# Patient Record
Sex: FEMALE | Race: WHITE | Employment: OTHER | URBAN - METROPOLITAN AREA
[De-identification: names, ages, dates, MRNs, and addresses within clinical notes are randomized per-mention and may not be internally consistent; named-entity substitution may affect disease eponyms.]

---

## 2022-04-11 ENCOUNTER — HOSPITAL ENCOUNTER (OUTPATIENT)
Dept: ORTHOPEDIC SURGERY | Age: 82
Discharge: HOME | End: 2022-04-13
Payer: MEDICARE

## 2022-04-11 ENCOUNTER — OFFICE VISIT (OUTPATIENT)
Dept: ORTHOPEDIC SURGERY | Age: 82
End: 2022-04-11
Payer: COMMERCIAL

## 2022-04-11 VITALS
OXYGEN SATURATION: 95 % | RESPIRATION RATE: 16 BRPM | WEIGHT: 141.4 LBS | BODY MASS INDEX: 26.7 KG/M2 | TEMPERATURE: 98 F | HEART RATE: 93 BPM | HEIGHT: 61 IN

## 2022-04-11 DIAGNOSIS — Z96.651 HX OF TOTAL KNEE REPLACEMENT, RIGHT: ICD-10-CM

## 2022-04-11 DIAGNOSIS — Z96.651 HX OF TOTAL KNEE REPLACEMENT, RIGHT: Primary | ICD-10-CM

## 2022-04-11 PROCEDURE — 73562 X-RAY EXAM OF KNEE 3: CPT | Performed by: PHYSICIAN ASSISTANT

## 2022-04-11 PROCEDURE — 73562 X-RAY EXAM OF KNEE 3: CPT

## 2022-04-11 PROCEDURE — 99214 OFFICE O/P EST MOD 30 MIN: CPT | Performed by: PHYSICIAN ASSISTANT

## 2022-04-11 RX ORDER — LOPERAMIDE HYDROCHLORIDE 2 MG/1
TABLET ORAL
COMMUNITY
Start: 2004-09-27

## 2022-04-11 RX ORDER — ZOLPIDEM TARTRATE 10 MG/1
5 TABLET ORAL
COMMUNITY
Start: 2019-12-26 | End: 2022-09-06

## 2022-04-11 RX ORDER — ASPIRIN 81 MG/1
81 TABLET ORAL DAILY
COMMUNITY
Start: 2020-09-23

## 2022-04-11 RX ORDER — OMEPRAZOLE 20 MG/1
20 CAPSULE, DELAYED RELEASE ORAL
COMMUNITY
Start: 2019-12-26

## 2022-04-11 RX ORDER — SIMVASTATIN 10 MG
10 TABLET ORAL NIGHTLY
COMMUNITY
Start: 2020-05-28

## 2022-04-11 RX ORDER — FLUTICASONE PROPIONATE 50 MCG
SPRAY, SUSPENSION (ML) NASAL
COMMUNITY
Start: 2019-12-26

## 2022-04-11 RX ORDER — AMOXICILLIN 500 MG/1
500 TABLET, FILM COATED ORAL
Status: ON HOLD | COMMUNITY
Start: 2018-03-07 | End: 2022-09-19 | Stop reason: ALTCHOICE

## 2022-04-11 RX ORDER — LOSARTAN POTASSIUM 25 MG/1
25 TABLET ORAL DAILY
COMMUNITY
Start: 2019-12-26

## 2022-04-11 RX ORDER — LORATADINE 10 MG/1
10 TABLET ORAL NIGHTLY
COMMUNITY
Start: 2019-12-26

## 2022-04-11 RX ORDER — PAROXETINE HYDROCHLORIDE 20 MG/1
TABLET, FILM COATED ORAL
COMMUNITY
Start: 2022-02-19

## 2022-04-11 RX ORDER — TRIAMTERENE AND HYDROCHLOROTHIAZIDE 37.5; 25 MG/1; MG/1
TABLET ORAL
COMMUNITY
Start: 2022-02-21

## 2022-04-11 RX ORDER — HYDROXYZINE HYDROCHLORIDE 10 MG/1
TABLET, FILM COATED ORAL
COMMUNITY
Start: 2004-09-27

## 2022-04-11 RX ORDER — ALBUTEROL SULFATE 90 UG/1
1 AEROSOL, METERED RESPIRATORY (INHALATION) EVERY 6 HOURS PRN
COMMUNITY
Start: 2013-07-07

## 2022-04-11 ASSESSMENT — ENCOUNTER SYMPTOMS
EYES NEGATIVE: 1
GASTROINTESTINAL NEGATIVE: 1
RESPIRATORY NEGATIVE: 1

## 2022-04-11 NOTE — PROGRESS NOTES
Merrill Clark (:  1940) is a [de-identified] y.o. female,Established patient, here for evaluation of the following chief complaint(s):  Knee Pain (hx of right knee replacement 2 yr ago, having pain. Former [facility] pt. Pt rates pain today /10. Veries, states she has a pinching sensation.)         ASSESSMENT/PLAN:  1. Hx of total knee replacement, right  -     XR KNEE RIGHT (3 VIEWS); Future  -     NM BONE SCAN 3 PHASE; Future      No follow-ups on file. Subjective   SUBJECTIVE/OBJECTIVE:  This is a 43-year-old female patient that I have seen previously for both of her knees. She states she had a right total knee replacement performed at the [facility] clinic by Dr. Mart Hamper May 28, 2020. She states she has had continued pain at the anterior lateral aspect of the knee. She states the knee was drained of a large amount of bloody fluid after her surgery. She states she continues to complain of a catching in the knee that is painful. Is performed extended visits to physical therapy. Review of Systems   Constitutional: Negative. HENT: Negative. Eyes: Negative. Respiratory: Negative. Gastrointestinal: Negative. Genitourinary: Negative. Musculoskeletal: Negative. Psychiatric/Behavioral: Negative. Objective      Three-view x-rays of the right knee show intact right total knee arthroplasty with patellar resurfacing in good alignment. Physical Exam  Musculoskeletal:      Comments: Right knee-no joint effusion. No warmth, erythema, fluctuance or sign of infection. Full extension, flexion is 120 degrees. The knee joint is stable, there is no laxity with valgus or varus stress. There is tenderness with palpation of the lateral tibial plateau. Femoral condyles are nontender. Shoulder compression test negative. Calf is soft and nontender. I explained to the patient and her knee plain film x-rays do not show any evidence of loosening.   I suggested we proceed with a bone scan for fear of prosthetic loosening. She is to follow-up with me after the bone scan. On this date 4/11/2022 I have spent 25 minutes reviewing previous notes, test results and face to face with the patient discussing the diagnosis and importance of compliance with the treatment plan as well as documenting on the day of the visit. An electronic signature was used to authenticate this note.     --SEBAS Guy

## 2022-04-15 ENCOUNTER — HOSPITAL ENCOUNTER (OUTPATIENT)
Dept: NUCLEAR MEDICINE | Age: 82
Discharge: HOME | End: 2022-04-17
Payer: MEDICARE

## 2022-04-15 DIAGNOSIS — Z96.651 HX OF TOTAL KNEE REPLACEMENT, RIGHT: ICD-10-CM

## 2022-04-15 PROCEDURE — 3430000000 HC RX DIAGNOSTIC RADIOPHARMACEUTICAL: Performed by: PHYSICIAN ASSISTANT

## 2022-04-15 PROCEDURE — A9503 TC99M MEDRONATE: HCPCS | Performed by: PHYSICIAN ASSISTANT

## 2022-04-15 PROCEDURE — 78315 BONE IMAGING 3 PHASE: CPT

## 2022-04-15 RX ORDER — TC 99M MEDRONATE 20 MG/10ML
25 INJECTION, POWDER, LYOPHILIZED, FOR SOLUTION INTRAVENOUS
Status: COMPLETED | OUTPATIENT
Start: 2022-04-15 | End: 2022-04-15

## 2022-04-15 RX ADMIN — TC 99M MEDRONATE 28.2 MILLICURIE: 20 INJECTION, POWDER, LYOPHILIZED, FOR SOLUTION INTRAVENOUS at 11:14

## 2022-05-02 ENCOUNTER — OFFICE VISIT (OUTPATIENT)
Dept: ORTHOPEDIC SURGERY | Age: 82
End: 2022-05-02
Payer: MEDICARE

## 2022-05-02 DIAGNOSIS — M25.561 CHRONIC KNEE PAIN AFTER TOTAL REPLACEMENT OF RIGHT KNEE JOINT: ICD-10-CM

## 2022-05-02 DIAGNOSIS — Z96.651 CHRONIC KNEE PAIN AFTER TOTAL REPLACEMENT OF RIGHT KNEE JOINT: ICD-10-CM

## 2022-05-02 DIAGNOSIS — Z96.651 HX OF TOTAL KNEE REPLACEMENT, RIGHT: Primary | ICD-10-CM

## 2022-05-02 DIAGNOSIS — G89.29 CHRONIC KNEE PAIN AFTER TOTAL REPLACEMENT OF RIGHT KNEE JOINT: ICD-10-CM

## 2022-05-02 PROCEDURE — 99213 OFFICE O/P EST LOW 20 MIN: CPT | Performed by: PHYSICIAN ASSISTANT

## 2022-05-02 ASSESSMENT — ENCOUNTER SYMPTOMS
GASTROINTESTINAL NEGATIVE: 1
EYES NEGATIVE: 1
RESPIRATORY NEGATIVE: 1

## 2022-05-02 NOTE — PROGRESS NOTES
Merrill Clark (:  1940) is a [de-identified] y.o. female,Established patient, here for evaluation of the following chief complaint(s):  Follow-up (bone density test results. Test was done 04/15/2022)         ASSESSMENT/PLAN:  1. Hx of total knee replacement, right  -     Ambulatory referral to Orthopedic Surgery  2. Chronic knee pain after total replacement of right knee joint  -     Ambulatory referral to Orthopedic Surgery      No follow-ups on file. Subjective   SUBJECTIVE/OBJECTIVE:  This is an 23-year-old female with complaint of right knee pain after right total knee replacement. She had the replacement performed by Dr. Samuel Garvey May 28, 2020. She states she has had pain since the surgery. States the pain is at the anterior lateral aspect of the knee joint. 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 She states the pain comes and goes. She states bending the knee increases the pain. She has had a bone scan and is here for results. Review of Systems   Constitutional: Negative. HENT: Negative. Eyes: Negative. Respiratory: Negative. Gastrointestinal: Negative. Genitourinary: Negative. Musculoskeletal: Negative. Psychiatric/Behavioral: Negative.            Objective    Three-phase bone scan performed April 15, 2022 shows:  NM BONE SCAN 3 PHASE : 4/15/2022       CLINICAL HISTORY: Z96.651 Hx of total knee replacement, right ICD10.       COMPARISON: Outside knee radiographs 2022       TECHNIQUE: After the intravenous administration of approximately 28.2 mCi of technetium 99m MDP, a three-phase bone scan of both knees was obtained.       Delayed imaging was obtained of the whole body.           FINDINGS:        There is no significant increased blood flow or abnormal blood pool activity identified to the knees.       Expected delayed radiotracer accumulation is noted around the total right knee arthroplasty, and the lateral left knee hemiarthroplasty.     Degenerative appearing uptake is noted, predominantly of the lumbar spine, ankles, shoulders, and left knee.                   Impression       ESSENTIALLY NEGATIVE THREE-PHASE BONE SCAN OF THE RIGHT KNEE, AS NOTED.             Physical Exam  Constitutional:       Appearance: Normal appearance. HENT:      Head: Normocephalic and atraumatic. Mouth/Throat:      Mouth: Mucous membranes are moist.   Eyes:      Extraocular Movements: Extraocular movements intact. Musculoskeletal:      Cervical back: Normal range of motion. Comments: Right knee-no swelling or ecchymosis. No warmth, erythema, fluctuance or sign of infection. Full extension, flexion to 125 degrees. The knee joint is stable, there is no laxity with valgus or varus stress. Tenderness with palpation at the anterior lateral joint line. There is soft tissue prominence there that is painful when touched. Calf is soft and nontender. Skin:     General: Skin is warm and dry. Neurological:      General: No focal deficit present. Mental Status: She is oriented to person, place, and time. Psychiatric:         Mood and Affect: Mood normal.     Explained the bone scan to the patient is does not show any sign of loosening. I recommended she meet with Dr. Ilda Adan to discuss possibilities for her continued pain. She does have a soft tissue prominence in that area. I am unsure what this could be. She has tried topical anti-inflammatory medications with only limited improvement. On this date 5/2/2022 I have spent 25 minutes reviewing previous notes, test results and face to face with the patient discussing the diagnosis and importance of compliance with the treatment plan as well as documenting on the day of the visit. An electronic signature was used to authenticate this note.     --SEBAS Sy

## 2022-05-13 ENCOUNTER — OFFICE VISIT (OUTPATIENT)
Dept: ORTHOPEDIC SURGERY | Age: 82
End: 2022-05-13
Payer: MEDICARE

## 2022-05-13 VITALS
BODY MASS INDEX: 26.62 KG/M2 | OXYGEN SATURATION: 97 % | HEART RATE: 70 BPM | HEIGHT: 61 IN | TEMPERATURE: 97 F | WEIGHT: 141 LBS

## 2022-05-13 DIAGNOSIS — Z96.651 PAIN DUE TO TOTAL RIGHT KNEE REPLACEMENT, INITIAL ENCOUNTER (HCC): Primary | ICD-10-CM

## 2022-05-13 DIAGNOSIS — T84.84XA PAIN DUE TO TOTAL RIGHT KNEE REPLACEMENT, INITIAL ENCOUNTER (HCC): Primary | ICD-10-CM

## 2022-05-13 PROCEDURE — 20610 DRAIN/INJ JOINT/BURSA W/O US: CPT | Performed by: ORTHOPAEDIC SURGERY

## 2022-05-13 PROCEDURE — G8427 DOCREV CUR MEDS BY ELIG CLIN: HCPCS | Performed by: ORTHOPAEDIC SURGERY

## 2022-05-13 PROCEDURE — 99204 OFFICE O/P NEW MOD 45 MIN: CPT | Performed by: ORTHOPAEDIC SURGERY

## 2022-05-13 PROCEDURE — G8417 CALC BMI ABV UP PARAM F/U: HCPCS | Performed by: ORTHOPAEDIC SURGERY

## 2022-05-13 PROCEDURE — 1090F PRES/ABSN URINE INCON ASSESS: CPT | Performed by: ORTHOPAEDIC SURGERY

## 2022-05-13 RX ORDER — LIDOCAINE HYDROCHLORIDE 10 MG/ML
8 INJECTION, SOLUTION INFILTRATION; PERINEURAL ONCE
Status: COMPLETED | OUTPATIENT
Start: 2022-05-13 | End: 2022-05-13

## 2022-05-13 RX ORDER — TRIAMCINOLONE ACETONIDE 40 MG/ML
80 INJECTION, SUSPENSION INTRA-ARTICULAR; INTRAMUSCULAR ONCE
Status: COMPLETED | OUTPATIENT
Start: 2022-05-13 | End: 2022-05-13

## 2022-05-13 RX ORDER — PREDNISONE 10 MG/1
TABLET ORAL
Status: ON HOLD | COMMUNITY
Start: 2022-05-12 | End: 2022-09-19 | Stop reason: HOSPADM

## 2022-05-13 RX ADMIN — TRIAMCINOLONE ACETONIDE 80 MG: 40 INJECTION, SUSPENSION INTRA-ARTICULAR; INTRAMUSCULAR at 13:13

## 2022-05-13 RX ADMIN — LIDOCAINE HYDROCHLORIDE 8 ML: 10 INJECTION, SOLUTION INFILTRATION; PERINEURAL at 13:11

## 2022-05-13 ASSESSMENT — ENCOUNTER SYMPTOMS
EYE ITCHING: 0
DIARRHEA: 0
EYE DISCHARGE: 0
EYE PAIN: 0
CONSTIPATION: 0
ABDOMINAL PAIN: 0
SHORTNESS OF BREATH: 0
COUGH: 0

## 2022-05-13 NOTE — PROGRESS NOTES
Patient ID:  Kasia Black is a [de-identified] y.o. female who presents today for evaluation of right knee pain. The patient reports that 2 years ago she had a right total knee replacement. She has been having sharp catching pains on the lateral aspect of the right knee ever since.     Injury: no  Metal Allergy: no    Location: right  knee pain, located on the lateral aspect of the knee  Pain: yes; 8 on a scale of 1 to 10  Onset: gradual  Duration: 2 years  Frequency:  occurs intermittently and occurs daily  Quality: sharp and shooting   Swelling: patient notes intermittent swelling of the joint  Aggravating factors: weight bearing activity, standing and walking  Alleviating factors: removing weight from leg and rest  Mechanical symptoms: catching  Radiation: no    Activities: walking independently  Restriction:  decreased ambulatory tolerance  Progression:  worsening    Previous treatment:  knee replacement  NSAIDs:  intolerant of NSAIDs  PT:  Physical therapy, completed    Medications:    Current Outpatient Medications on File Prior to Visit   Medication Sig Dispense Refill    predniSONE (DELTASONE) 10 MG tablet       aspirin 81 MG EC tablet Take 81 mg by mouth daily      Amoxicillin 500 MG TABS Take 500 mg by mouth      albuterol sulfate HFA (PROAIR HFA) 108 (90 Base) MCG/ACT inhaler Inhale 1 puff into the lungs every 6 hours as needed      calcium carbonate 1500 (600 Ca) MG TABS tablet Take 1,200 mg by mouth      vitamin D (CHOLECALCIFEROL) 25 MCG (1000 UT) TABS tablet Take by mouth      fluticasone (FLONASE ALLERGY RELIEF) 50 MCG/ACT nasal spray   See Instructions, Allergy symptoms, Refill(s) 0, 1 spray(s) Nasal Daily      hydrOXYzine (ATARAX) 10 MG tablet prn      loperamide (IMODIUM A-D) 2 MG tablet prn      loratadine (CLARITIN) 10 MG tablet Take 10 mg by mouth nightly      losartan (COZAAR) 25 MG tablet Take 25 mg by mouth daily      omeprazole (PRILOSEC) 20 MG delayed release capsule Take 20 mg by mouth  PARoxetine (PAXIL) 20 MG tablet       simvastatin (ZOCOR) 10 MG tablet Take 10 mg by mouth nightly      zolpidem (AMBIEN) 10 MG tablet Take 5 mg by mouth.  triamterene-hydroCHLOROthiazide (MAXZIDE-25) 37.5-25 MG per tablet        No current facility-administered medications on file prior to visit. Allergies: Allergies   Allergen Reactions    Propoxyphene Other (See Comments)     vomiting  tolerates oxycodone  Patient states she is not allergic. Past Medical History:    Past Medical History:   Diagnosis Date    Acute eczema     Anxiety     Hypertension        Past Surgical History:    Past Surgical History:   Procedure Laterality Date    JOINT REPLACEMENT Left     Partial    TOTAL KNEE ARTHROPLASTY Right 2020       Social History:    Social History     Socioeconomic History    Marital status:      Spouse name: Not on file    Number of children: Not on file    Years of education: Not on file    Highest education level: Not on file   Occupational History    Not on file   Tobacco Use    Smoking status: Former Smoker     Packs/day: 0.50     Years: 30.00     Pack years: 15.00     Quit date: 80     Years since quittin.3    Smokeless tobacco: Never Used   Substance and Sexual Activity    Alcohol use: Not on file    Drug use: Not on file    Sexual activity: Not on file   Other Topics Concern    Not on file   Social History Narrative    Not on file     Social Determinants of Health     Financial Resource Strain:     Difficulty of Paying Living Expenses: Not on file   Food Insecurity:     Worried About [address] in the Last Year: Not on file    [address] in the Last Year: Not on file   Transportation Needs:     Lack of Transportation (Medical): Not on file    Lack of Transportation (Non-Medical):  Not on file   Physical Activity:     Days of Exercise per Week: Not on file    Minutes of Exercise per Session: Not on file   Stress:     Feeling of Stress : Not on file   Social Connections:     Frequency of Communication with Friends and Family: Not on file    Frequency of Social Gatherings with Friends and Family: Not on file    Attends Voodoo Services: Not on file    Active Member of Clubs or Organizations: Not on file    Attends Club or Organization Meetings: Not on file    Marital Status: Not on file   Intimate Partner Violence:     Fear of Current or Ex-Partner: Not on file    Emotionally Abused: Not on file    Physically Abused: Not on file    Sexually Abused: Not on file   Housing Stability:     Unable to Pay for Housing in the Last Year: Not on file    Number of [place] in the Last Year: Not on file    Unstable Housing in the Last Year: Not on file       Family History:    History reviewed. No pertinent family history. Occupation:  retired   - Occupational requirements:  none    Workers Compensation:  Have you missed work for this issue?  no  Is this issue being addressed under a worker's comp claim? no    Review of Systems:    Review of Systems   Constitutional: Negative for activity change, appetite change and chills. HENT: Negative for congestion, ear pain and hearing loss. Eyes: Negative for pain, discharge and itching. Respiratory: Negative for cough and shortness of breath. Cardiovascular: Negative for chest pain and leg swelling. Gastrointestinal: Negative for abdominal pain, constipation and diarrhea. Endocrine: Negative for cold intolerance, heat intolerance and polydipsia. Genitourinary: Negative for difficulty urinating, flank pain and frequency. Skin: Negative for rash and wound. Allergic/Immunologic: Negative for environmental allergies and food allergies. Neurological: Negative for dizziness, seizures and syncope.        Physical Exam:    Examination of the right knee    Gait:  antalgic gait affecting right  Inspection:  neutral  Swelling:  none  Erythema:  none  Ecchymosis: none  Effusion:  1+  Palpation:  lateral joint line  Extension:  0  Flexion:  120  Strength:  5  Varus/Valgus Instability:  none  Anterior/Posterior Instability:  none  Patellar compression:  negative  Neurological/Vascular:  Sensation grossly intact. Dorsalis pedis palpable and 2+    Radiographs:  Radiographs of the right knee were personally reviewed, and they revealed: Three-view x-rays of the right knee including AP weightbearing, lateral as    well as skyline views show an intact right total knee arthroplasty with    patellar resurfacing.  Left knee shows a lateral unicompartmental knee    replacement. Bone scan:    FINDINGS:        There is no significant increased blood flow or abnormal blood pool activity identified to the knees.       Expected delayed radiotracer accumulation is noted around the total right knee arthroplasty, and the lateral left knee hemiarthroplasty.       Degenerative appearing uptake is noted, predominantly of the lumbar spine, ankles, shoulders, and left knee.                   Impression       ESSENTIALLY NEGATIVE THREE-PHASE BONE SCAN OF THE RIGHT KNEE, AS NOTED. Aspiration of knee:   Ref Range & Units 10/23/20 1240   Site, SF  Right Knee    Color, SF Yellow Bloody Abnormal     Clarity, SF Clear Cloudy Abnormal     Supernatant Color, SF Yellow Alexia Abnormal     Supernatant Clarity, SF Clear Clear    RBC, SF <2,000 /uL [id number] High     Total Nucleated Cells, SF 0 - 200 /uL 731 High     Synovial Comment  SEE COMMENT    Comment: PRELIMINARY REPORT  No diagnostic crystals seen.  SEE FINAL SYN FLD PATH   REVIEW          Diagnosis:   Diagnosis Orders   1. Pain due to total right knee replacement, initial encounter ([facility])  MN ARTHROCENTESIS ASPIR&/INJ MAJOR JT/BURSA W/O US       Plan:    The patient has pain localized to the lateral joint line. My suspicion is that she could possibly have some synovitis which could be getting caught between the implants. We discussed this. In order to confirm this, we discussed an intra-articular steroid injection. I explained to her that this would shrink up the synovitis. If this gives her pain relief, this would confirm what we were suspecting. If the pain were then to return, we could possibly discuss an arthroscopic debridement of the synovium in the anterior lateral knee. The patient wished to proceed with this. We did discuss the possibility of infection with the injection. Patient expressed understanding and consented. Follow-up as needed. Time Out  [x] Patient Verified  [x] Site Verified  [x] Laterality Verified  [x] Procedure Verified    Before aspiration/injection risks/benefits of a cortisone injection including infection, local skin irritation, skin atrophy, continued pain/discomfort, elevated blood sugar, burning, failure to relieve pain, possible late infection were discussed with patient. Patient verbalized understanding and wanted to proceed with planned procedure. After prepping and draping the right knee in the usual sterile fashion, 2 cc of 40 mg/ml kenalog with 8 cc of 1% lidocaine were injected intra-articularly without any complications. Patient tolerated this well. Post-procedure discomfort can be alleviated with additional medications/ice/elevation/rest over the first 24 hours as recommended. The patient tolerated the procedure well. If fluid was aspirated it was sent for further studies  in sterile specimen container as ordered to the lab    Orders Placed This Encounter   Procedures    CO ARTHROCENTESIS ASPIR&/INJ MAJOR JT/BURSA W/O US       Orders Placed This Encounter   Medications    lidocaine 1 % injection 8 mL    triamcinolone acetonide (KENALOG-40) injection 80 mg       Return if symptoms worsen or fail to improve.     Olivia Johnson MD

## 2022-05-13 NOTE — PROGRESS NOTES
Patient's name, date of birth, and allergies have been confirmed. Patient is aware that injection is to be given in Right knee. He/she is aware that they will be seeing Dr. Leesa Saint and the injection will be given by him.

## 2022-07-29 ENCOUNTER — OFFICE VISIT (OUTPATIENT)
Dept: ORTHOPEDIC SURGERY | Age: 82
End: 2022-07-29
Payer: MEDICARE

## 2022-07-29 VITALS
TEMPERATURE: 97 F | BODY MASS INDEX: 26.62 KG/M2 | HEART RATE: 85 BPM | HEIGHT: 61 IN | OXYGEN SATURATION: 96 % | WEIGHT: 141 LBS

## 2022-07-29 DIAGNOSIS — T84.84XA PAIN DUE TO TOTAL RIGHT KNEE REPLACEMENT, INITIAL ENCOUNTER (HCC): Primary | ICD-10-CM

## 2022-07-29 DIAGNOSIS — Z96.651 PAIN DUE TO TOTAL RIGHT KNEE REPLACEMENT, INITIAL ENCOUNTER (HCC): Primary | ICD-10-CM

## 2022-07-29 PROCEDURE — 99214 OFFICE O/P EST MOD 30 MIN: CPT | Performed by: ORTHOPAEDIC SURGERY

## 2022-07-29 PROCEDURE — 1123F ACP DISCUSS/DSCN MKR DOCD: CPT | Performed by: ORTHOPAEDIC SURGERY

## 2022-07-29 NOTE — PROGRESS NOTES
Patient ID:  Fatou Lynch is a 81 y.o. female who presents today for follow up of right knee pain. This is a patient who had a painful knee replacement. Suspicion was that she was having synovial impingement in flexion. She received an intra-articular steroid injection and her symptoms completely went away for about a month. They are slowly coming back. She reports pinching sensations on the lateral side of the knee when going from flexion to extension.     Injury: no    Location: right  knee pain, located on the lateral aspect of the knee  Pain: yes; 6 on a scale of 1 to 10  Onset: gradual  Duration: 2 years  Frequency:  occurs intermittently and occurs daily  Quality: sharp   Swelling: patient notes intermittent swelling of the joint  Aggravating factors: weight bearing activity, standing, and walking  Alleviating factors: removing weight from leg and rest  Mechanical symptoms: none  Radiation: no    Activities: walking independently  Restriction:  decreased ambulatory tolerance  Progression:  worsening    Previous treatment:  steroid injection   NSAIDs:  none  PT:  none    Medications:    Current Outpatient Medications on File Prior to Visit   Medication Sig Dispense Refill    predniSONE (DELTASONE) 10 MG tablet       aspirin 81 MG EC tablet Take 81 mg by mouth daily      Amoxicillin 500 MG TABS Take 500 mg by mouth      albuterol sulfate HFA (PROVENTIL;VENTOLIN;PROAIR) 108 (90 Base) MCG/ACT inhaler Inhale 1 puff into the lungs every 6 hours as needed      calcium carbonate 1500 (600 Ca) MG TABS tablet Take 1,200 mg by mouth      vitamin D (CHOLECALCIFEROL) 25 MCG (1000 UT) TABS tablet Take by mouth      fluticasone (FLONASE) 50 MCG/ACT nasal spray   See Instructions, Allergy symptoms, Refill(s) 0, 1 spray(s) Nasal Daily      hydrOXYzine (ATARAX) 10 MG tablet prn      loperamide (IMODIUM A-D) 2 MG tablet prn      loratadine (CLARITIN) 10 MG tablet Take 10 mg by mouth nightly      losartan (COZAAR) 25 MG tablet Take 25 mg by mouth daily      omeprazole (PRILOSEC) 20 MG delayed release capsule Take 20 mg by mouth      PARoxetine (PAXIL) 20 MG tablet       simvastatin (ZOCOR) 10 MG tablet Take 10 mg by mouth nightly      zolpidem (AMBIEN) 10 MG tablet Take 5 mg by mouth.      triamterene-hydroCHLOROthiazide (MAXZIDE-25) 37.5-25 MG per tablet        No current facility-administered medications on file prior to visit. Allergies: Allergies   Allergen Reactions    Propoxyphene Other (See Comments)     vomiting  tolerates oxycodone  Patient states she is not allergic. Physical Exam:    Examination of the right knee    Gait:  antalgic gait affecting right  Inspection:  neutral  Swelling:  none  Erythema:  none  Ecchymosis:  none  Effusion:  1+  Palpation:  lateral joint line  Extension:  0  Flexion:  120  Strength:  5  Varus/Valgus stability:  none  Anterior/Posterior Instability:  none  Peter:  N/A  Thessaly:  N/A  Modified Apley:  N/A  Lachman:  N/A  Patellar compression:  negative  Neurological/Vascular:  Sensation grossly intact. Dorsalis pedis palpable and 2+    Radiographs:  None today    MRI: None    Diagnosis:   Diagnosis Orders   1. Pain due to total right knee replacement, initial encounter [facility])  Basic Metabolic Panel    CBC with Auto Differential    Urinalysis          Plan:    The patient has what appears to be synovial pinching in the knee. She got a good response from a steroid injection. Unfortunately, as its worn off of the pinching in the mechanical symptoms have returned. The patient reports that this is fairly debilitating for her. She mostly spends the day dreading when it is going to happen next. So treatment options were discussed. We discussed doing an arthroscopic partial synovectomy, wherein we would go into the knee and remove what ever synovium on the lateral side of the knee could be being pinched between the implant. The patient wishes to proceed with this.   We discussed the surgery. We discussed the recovery. We discussed the risks of surgery. Risks include infection, persistent pain, and recurrence. The patient expressed understanding. We are going to get her on the schedule for a partial arthroscopic synovectomy of the right knee. Orders Placed This Encounter   Procedures    Basic Metabolic Panel     Standing Status:   Future     Standing Expiration Date:   2023    CBC with Auto Differential     Standing Status:   Future     Standing Expiration Date:   2023    Urinalysis     Standing Status:   Future     Standing Expiration Date:   2023       No orders of the defined types were placed in this encounter. No follow-ups on file. Isabelle Lacy MD        Surgery Phone: [phone]   [facility]   Surgery Fax: [phone]    Phone: [phone]          Fax: [phone]: Surgery Scheduling, PAT & PRE-OP Order Form  Call to advance [place] at [phone] at least 24 hours prior to date of service     Surgery Location: [facility] Surgery: [address] [place] [address]  Dio Villafana MD Surgery Date: 10/31/2022 time: To follow  Patient's Name: Fatou Lynch : 1940    SS#:  [id number]  Gender: female  Home Phone:  [phone] Cell Phone: [phone]  Emergency Contact:  Idris Faulkner   Phone:   Payor: Paul Albert /  /  /    ID No.: [id number]      PROVIDER TO COMPLETE:  Diagnosis: The encounter diagnosis was Pain due to total right knee replacement, initial encounter [facility] Procedure/Consent: Arthroscopic synovectomy of the right knee  CPT CODES: 43478  Case Comments/Implants: Arthroscopy equipment  Surgery Scheduled as:  Outpatient  Anesthesia Requested: Choice with adductor canal block  Referring Family Doctor: Sena Meyers DO   PAT  [x] [id number] [facility] Date/Time:                                                            [x] History & Physical [] Physician will Provide [] Attached [] Dictated [] Other  [x] Follow Anesthesia Pre-Op Orders for X-rays, [facility]     [x] SN & PT to evaluate and treat/educate disease management, medications, home safety & equipment needs for total joint patients  [] Other: ____________________________________________________  Consults: Medical/Cardiac Clearance done by  ____________________  PRE-OP ORDERS:   Allergies: Propoxyphene Latex Allergies:             Diabetic:           [] IV ________________________  [x] IV Start with J-loop     Preprinted Orders: Attached [] Yes [] No   ANTIBIOTIC PRE-OP: [x] ANCEF 2 gram IVPB if > 120 kg 3 grams IVPB within 1 hour of incision, if ALLERGIC, use VANCOMYCIN 1 gram IV, 2 hours pre-op  [x] TXA Protocol [] Other:   [x] NPO   [] Betablocker (if needed) _____________________________________   [x] Knee high anti-embolic hose [] Thigh high anti-embolic hose   Other: ______________________________________________________    Physician Signature Required:    Date/Time: 7/29/2022

## 2022-08-02 ENCOUNTER — TELEPHONE (OUTPATIENT)
Dept: ORTHOPEDIC SURGERY | Age: 82
End: 2022-08-02

## 2022-08-02 NOTE — TELEPHONE ENCOUNTER
Surgery Scheduling and Authorization Information    Surgery Date:10/31/2022  Surgery good through dates:   CPT Code(s) [phone]  Procedure(s) R arthroscopic knee replacement      Approved [] Not Required [x] Denied []  Reference # [id number]  Auth #    How authorization obtained:  [] web portal             [x] via phone       [] Via fax    Provider  [] Santo Olvera  [] Mario Paul  [x] [place] June  [] Jus Cooper  [] Jeremías Saab  [] Yolande Desouza  [] Wanita Cheadle  [] Wero Bautista  [] Nishant Gregg  [] Shelton Ponce      Surgery Sheet Faxed to Scheduling [x]  Surgery and Prior Authorization Information Sheet Scanned [x]

## 2022-09-06 ENCOUNTER — OFFICE VISIT (OUTPATIENT)
Dept: ORTHOPEDIC SURGERY | Age: 82
End: 2022-09-06
Payer: MEDICARE

## 2022-09-06 VITALS
TEMPERATURE: 97 F | HEART RATE: 82 BPM | BODY MASS INDEX: 25.68 KG/M2 | SYSTOLIC BLOOD PRESSURE: 135 MMHG | HEIGHT: 61 IN | DIASTOLIC BLOOD PRESSURE: 67 MMHG | WEIGHT: 136 LBS | OXYGEN SATURATION: 97 %

## 2022-09-06 DIAGNOSIS — Z01.818 PRE-OP EXAM: Primary | ICD-10-CM

## 2022-09-06 DIAGNOSIS — Z96.651 PAIN DUE TO TOTAL RIGHT KNEE REPLACEMENT, INITIAL ENCOUNTER (HCC): ICD-10-CM

## 2022-09-06 DIAGNOSIS — T84.84XA PAIN DUE TO TOTAL RIGHT KNEE REPLACEMENT, INITIAL ENCOUNTER (HCC): ICD-10-CM

## 2022-09-06 LAB
ANION GAP SERPL CALCULATED.3IONS-SCNC: 14 MEQ/L (ref 9–15)
BACTERIA: NEGATIVE /HPF
BASOPHILS ABSOLUTE: 0 K/UL (ref 0–0.2)
BASOPHILS RELATIVE PERCENT: 0.1 %
BILIRUBIN URINE: NEGATIVE
BLOOD, URINE: NEGATIVE
BUN BLDV-MCNC: 22 MG/DL (ref 8–23)
CALCIUM SERPL-MCNC: 9.7 MG/DL (ref 8.5–9.9)
CHLORIDE BLD-SCNC: 102 MEQ/L (ref 95–107)
CLARITY: CLEAR
CO2: 27 MEQ/L (ref 20–31)
COLOR: ABNORMAL
CREAT SERPL-MCNC: 0.78 MG/DL (ref 0.5–0.9)
EOSINOPHILS ABSOLUTE: 0.1 K/UL (ref 0–0.7)
EOSINOPHILS RELATIVE PERCENT: 2.2 %
EPITHELIAL CELLS, UA: ABNORMAL /HPF (ref 0–5)
GFR AFRICAN AMERICAN: >60
GFR NON-AFRICAN AMERICAN: >60
GLUCOSE BLD-MCNC: 84 MG/DL (ref 70–99)
GLUCOSE URINE: NEGATIVE MG/DL
HCT VFR BLD CALC: 35.4 % (ref 37–47)
HEMOGLOBIN: 12 G/DL (ref 12–16)
HYALINE CASTS: ABNORMAL /HPF (ref 0–5)
KETONES, URINE: NEGATIVE MG/DL
LEUKOCYTE ESTERASE, URINE: ABNORMAL
LYMPHOCYTES ABSOLUTE: 1.3 K/UL (ref 1–4.8)
LYMPHOCYTES RELATIVE PERCENT: 24.8 %
MCH RBC QN AUTO: 29.9 PG (ref 27–31.3)
MCHC RBC AUTO-ENTMCNC: 33.8 % (ref 33–37)
MCV RBC AUTO: 88.3 FL (ref 82–100)
MONOCYTES ABSOLUTE: 0.6 K/UL (ref 0.2–0.8)
MONOCYTES RELATIVE PERCENT: 10.5 %
NEUTROPHILS ABSOLUTE: 3.4 K/UL (ref 1.4–6.5)
NEUTROPHILS RELATIVE PERCENT: 62.4 %
NITRITE, URINE: NEGATIVE
PDW BLD-RTO: 13.9 % (ref 11.5–14.5)
PH UA: 6 (ref 5–9)
PLATELET # BLD: 276 K/UL (ref 130–400)
POTASSIUM SERPL-SCNC: 4.2 MEQ/L (ref 3.4–4.9)
PROTEIN UA: NEGATIVE MG/DL
RBC # BLD: 4.01 M/UL (ref 4.2–5.4)
RBC UA: ABNORMAL /HPF (ref 0–5)
SODIUM BLD-SCNC: 143 MEQ/L (ref 135–144)
SPECIFIC GRAVITY UA: 1.02 (ref 1–1.03)
UROBILINOGEN, URINE: 0.2 E.U./DL
WBC # BLD: 5.4 K/UL (ref 4.8–10.8)
WBC UA: ABNORMAL /HPF (ref 0–5)

## 2022-09-06 PROCEDURE — 99212 OFFICE O/P EST SF 10 MIN: CPT | Performed by: PHYSICIAN ASSISTANT

## 2022-09-06 PROCEDURE — 93000 ELECTROCARDIOGRAM COMPLETE: CPT | Performed by: PHYSICIAN ASSISTANT

## 2022-09-06 RX ORDER — ZOLPIDEM TARTRATE 5 MG/1
TABLET ORAL
COMMUNITY
Start: 2022-07-21

## 2022-09-06 RX ORDER — CHLORHEXIDINE GLUCONATE 213 G/1000ML
SOLUTION TOPICAL
Qty: 118 ML | Refills: 0 | Status: ON HOLD | OUTPATIENT
Start: 2022-09-06 | End: 2022-09-19 | Stop reason: HOSPADM

## 2022-09-06 NOTE — PROGRESS NOTES
Subjective:     Patient is a 81 y.o.  female presented with a history of full right total knee replacement. Patient is scheduled for arthroscopic synovial ectomy surgery to be performed by Dr. Edith Hernandez 2022 at [facility]. This is a preoperative consultation request from Dr. Edith Hernandez. Onset of symptoms was abrupt 1 year ago with gradually worsening course since that time. She is being admitted for surgical management of this condition. The indications for the procedure include synovial impingement. There are no problems to display for this patient. Past Medical History:   Diagnosis Date    Acute eczema     Anxiety     Hypertension       Past Surgical History:   Procedure Laterality Date    JOINT REPLACEMENT Left     Partial    TOTAL KNEE ARTHROPLASTY Right 2020      Not in a hospital admission. Allergies   Allergen Reactions    Propoxyphene Other (See Comments)     vomiting  tolerates oxycodone  Patient states she is not allergic. Social History     Tobacco Use    Smoking status: Former     Packs/day: 0.50     Years: 30.00     Pack years: 15.00     Types: Cigarettes     Quit date:      Years since quittin.7    Smokeless tobacco: Never   Substance Use Topics    Alcohol use: Not on file      History reviewed. No pertinent family history. Review of Systems  A comprehensive review of systems was negative except for: Musculoskeletal: positive for right knee painful catching    Objective:     @IPVITALS[8@  /67 (Site: Left Upper Arm, Position: Sitting, Cuff Size: Medium Adult)   Pulse 82   Temp 97 °F (36.1 °C) (Temporal)   Ht 5' 1\" (1.549 m)   Wt 136 lb (61.7 kg)   SpO2 97%   BMI 25.70 kg/m²   General appearance: alert, appears stated age, and cooperative  Head: Normocephalic, without obvious abnormality, atraumatic  Eyes: conjunctivae/corneas clear. PERRL, EOM's intact. Fundi benign.   Ears: normal TM's and external ear canals both ears  Nose: Nares normal. Septum midline. Mucosa normal. No drainage or sinus tenderness. Throat: lips, mucosa, and tongue normal; teeth and gums normal  Neck: no adenopathy, no carotid bruit, no JVD, supple, symmetrical, trachea midline, and thyroid not enlarged, symmetric, no tenderness/mass/nodules  Back: symmetric, no curvature. ROM normal. No CVA tenderness. Lungs: clear to auscultation bilaterally  Heart: regular rate and rhythm, S1, S2 normal, no murmur, click, rub or gallop  Abdomen: soft, non-tender; bowel sounds normal; no masses,  no organomegaly  Extremities: extremities normal, atraumatic, no cyanosis or edema  Pulses: 2+ and symmetric  Skin: Skin color, texture, turgor normal. No rashes or lesions  Lymph nodes: Cervical, supraclavicular, and axillary nodes normal.  Neurologic: Grossly normal    Imaging Review  NM BONE SCAN 3 PHASE : 4/15/2022       CLINICAL HISTORY: Z96.651 Hx of total knee replacement, right ICD10. COMPARISON: Outside knee radiographs 4/11/2022       TECHNIQUE: After the intravenous administration of approximately 28.2 mCi of technetium 99m MDP, a three-phase bone scan of both knees was obtained. Delayed imaging was obtained of the whole body. FINDINGS:        There is no significant increased blood flow or abnormal blood pool activity identified to the knees. Expected delayed radiotracer accumulation is noted around the total right knee arthroplasty, and the lateral left knee hemiarthroplasty. Degenerative appearing uptake is noted, predominantly of the lumbar spine, ankles, shoulders, and left knee. Impression       ESSENTIALLY NEGATIVE THREE-PHASE BONE SCAN OF THE RIGHT KNEE, AS NOTED. Three-view x-rays of the right knee including AP weightbearing, lateral as    well as skyline views show an intact right total knee arthroplasty with    patellar resurfacing.   Left knee shows a lateral unicompartmental knee    replacement     EKG performed today reviewed by me shows sinus rhythm with a rate of 73. No acute changes. Assessment:     Active Problems:    * No active hospital problems. *  Resolved Problems:    * No resolved hospital problems. *      Plan:     The various methods of treatment have been discussed with the patient and family. After consideration of risks, benefits and other options for treatment, the patient has consented to surgical interventions (prior surgical consent signed in the presence of Dr. Emi Batista). Questions were answered and Pre-op teaching was done by myself. Patient understands to be n.p.o. beginning midnight the night before surgery. She will take her morning medications with sips of water. She will bring her walker with her to surgery. Please read below carefully for your personalized instructions. Dietary Restrictions:  - No solid food after midnight. - You may have 12 ounces of clear liquids (water, clear juices such as apple juice or gatorade, carbonated beverages, clear tea, black coffee, jello) until 2 hours before scheduled arrival at facility.   - Do not drink any alcohol after midnight the night before your surgery. Medications:  Unless instructed differently below, stay on all of your medications until your surgery. Approved medications to take the morning of surgery with a sip of water: If you start any new medications after today's visit, please contact the surgeon's office. Blood Thinning Medications:  - Stop NSAIDS (Ibuprofen, Advil, Aleve, Motrin, Celebrex, Mobic, etc.) 7 days before surgery, as directed by your surgeon.  - Stop Aspirin 7 days before surgery, as directed by your surgeon.  - Stop Vitamin E, ALL multi-vitamins, herbals and dietary supplements 7 days before surgery.  - You may take Tylenol (Acetaminophen) or any of your pain medications that do not contain aspirin or NSAIDS as needed.     Important Reminders:  - Candy, mints, and tobacco products are NOT permitted the morning of surgery. - Hearing aids, dentures and glasses may be worn the morning of surgery.  - NO jewelry, body piercings, makeup, hairpins or contacts are to be worn the day of surgery. If you develop symptoms such as a fever, cold, or flu, or have other changes to your health within TWO DAYS of scheduled surgery or the morning of surgery, please contact the surgery center above. Personal Belongings:  -Please have photo ID and insurance cards. -If you do not have a copy of advance directives on file with us, please bring a copy with you on the day of surgery. - Leave ALL valuables and money at home or with family members. For Outpatient Procedures:  - YOU MUST HAVE A RESPONSIBLE  TAKE YOU HOME. A  OR  CANNOT BE MADE A RESPONSIBLE . - We recommend that a responsible person stays with you overnight to take care of you.  - You cannot stay in a hotel alone after outpatient surgery. You will not be permitted to have your surgery, if you do not have someone to take care of you. Arrival Time for Surgery:  - The Surgery Center or hospital where you are having surgery will call the afternoon before surgery (or Friday for Monday surgery) with a scheduled arrival time. - If you have not heard by 4 pm, please contact the surgery center above. Please be aware that emergency situations arise, which may delay or change your surgical time. If this happens, we will notify you as soon as possible and regret any inconvenience.

## 2022-09-09 ENCOUNTER — TELEPHONE (OUTPATIENT)
Dept: ORTHOPEDIC SURGERY | Age: 82
End: 2022-09-09

## 2022-09-09 NOTE — TELEPHONE ENCOUNTER
Due to scheduling error patient needs to be moved from [place] 9/12/22 to 9/19/22 in [facility]. Per Dr Valencia Maya.

## 2022-09-19 ENCOUNTER — ANESTHESIA EVENT (OUTPATIENT)
Dept: OPERATING ROOM | Age: 82
End: 2022-09-19

## 2022-09-19 ENCOUNTER — ANESTHESIA (OUTPATIENT)
Dept: OPERATING ROOM | Age: 82
End: 2022-09-19

## 2022-09-19 ENCOUNTER — HOSPITAL ENCOUNTER (OUTPATIENT)
Age: 82
Setting detail: OUTPATIENT SURGERY
Discharge: HOME | End: 2022-09-19
Attending: ORTHOPAEDIC SURGERY | Admitting: ORTHOPAEDIC SURGERY
Payer: MEDICARE

## 2022-09-19 VITALS
DIASTOLIC BLOOD PRESSURE: 96 MMHG | TEMPERATURE: 97.1 F | OXYGEN SATURATION: 97 % | HEART RATE: 72 BPM | RESPIRATION RATE: 16 BRPM | BODY MASS INDEX: 25.68 KG/M2 | HEIGHT: 61 IN | SYSTOLIC BLOOD PRESSURE: 141 MMHG | WEIGHT: 136 LBS

## 2022-09-19 DIAGNOSIS — T84.84XD PAIN DUE TO TOTAL RIGHT KNEE REPLACEMENT, SUBSEQUENT ENCOUNTER: Primary | ICD-10-CM

## 2022-09-19 DIAGNOSIS — Z96.651 PAIN DUE TO TOTAL RIGHT KNEE REPLACEMENT, SUBSEQUENT ENCOUNTER: Primary | ICD-10-CM

## 2022-09-19 PROCEDURE — 6360000002 HC RX W HCPCS: Performed by: NURSE ANESTHETIST, CERTIFIED REGISTERED

## 2022-09-19 PROCEDURE — 2580000003 HC RX 258: Performed by: ANESTHESIOLOGY

## 2022-09-19 PROCEDURE — 2580000003 HC RX 258: Performed by: ORTHOPAEDIC SURGERY

## 2022-09-19 PROCEDURE — 7100000010 HC PHASE II RECOVERY - FIRST 15 MIN: Performed by: ORTHOPAEDIC SURGERY

## 2022-09-19 PROCEDURE — 3700000001 HC ADD 15 MINUTES (ANESTHESIA): Performed by: ORTHOPAEDIC SURGERY

## 2022-09-19 PROCEDURE — 6360000002 HC RX W HCPCS: Performed by: ORTHOPAEDIC SURGERY

## 2022-09-19 PROCEDURE — 2500000003 HC RX 250 WO HCPCS: Performed by: ANESTHESIOLOGY

## 2022-09-19 PROCEDURE — 7100000000 HC PACU RECOVERY - FIRST 15 MIN: Performed by: ORTHOPAEDIC SURGERY

## 2022-09-19 PROCEDURE — 2500000003 HC RX 250 WO HCPCS: Performed by: ORTHOPAEDIC SURGERY

## 2022-09-19 PROCEDURE — 2500000003 HC RX 250 WO HCPCS: Performed by: NURSE ANESTHETIST, CERTIFIED REGISTERED

## 2022-09-19 PROCEDURE — 29876 ARTHRS KNEE SURG SYNVCT MAJ: CPT | Performed by: ORTHOPAEDIC SURGERY

## 2022-09-19 PROCEDURE — 3600000014 HC SURGERY LEVEL 4 ADDTL 15MIN: Performed by: ORTHOPAEDIC SURGERY

## 2022-09-19 PROCEDURE — 7100000011 HC PHASE II RECOVERY - ADDTL 15 MIN: Performed by: ORTHOPAEDIC SURGERY

## 2022-09-19 PROCEDURE — 6360000002 HC RX W HCPCS: Performed by: ANESTHESIOLOGY

## 2022-09-19 PROCEDURE — 2720000010 HC SURG SUPPLY STERILE: Performed by: ORTHOPAEDIC SURGERY

## 2022-09-19 PROCEDURE — 76942 ECHO GUIDE FOR BIOPSY: CPT | Performed by: STUDENT IN AN ORGANIZED HEALTH CARE EDUCATION/TRAINING PROGRAM

## 2022-09-19 PROCEDURE — 7100000001 HC PACU RECOVERY - ADDTL 15 MIN: Performed by: ORTHOPAEDIC SURGERY

## 2022-09-19 PROCEDURE — 3700000000 HC ANESTHESIA ATTENDED CARE: Performed by: ORTHOPAEDIC SURGERY

## 2022-09-19 PROCEDURE — 2709999900 HC NON-CHARGEABLE SUPPLY: Performed by: ORTHOPAEDIC SURGERY

## 2022-09-19 PROCEDURE — 3600000004 HC SURGERY LEVEL 4 BASE: Performed by: ORTHOPAEDIC SURGERY

## 2022-09-19 RX ORDER — ONDANSETRON 2 MG/ML
4 INJECTION INTRAMUSCULAR; INTRAVENOUS
Status: DISCONTINUED | OUTPATIENT
Start: 2022-09-19 | End: 2022-09-19 | Stop reason: HOSPADM

## 2022-09-19 RX ORDER — FENTANYL CITRATE 50 UG/ML
50 INJECTION, SOLUTION INTRAMUSCULAR; INTRAVENOUS EVERY 10 MIN PRN
Status: DISCONTINUED | OUTPATIENT
Start: 2022-09-19 | End: 2022-09-19 | Stop reason: HOSPADM

## 2022-09-19 RX ORDER — OXYCODONE HYDROCHLORIDE 5 MG/1
5 TABLET ORAL EVERY 4 HOURS PRN
Status: DISCONTINUED | OUTPATIENT
Start: 2022-09-19 | End: 2022-09-19 | Stop reason: HOSPADM

## 2022-09-19 RX ORDER — DEXAMETHASONE SODIUM PHOSPHATE 4 MG/ML
INJECTION, SOLUTION INTRA-ARTICULAR; INTRALESIONAL; INTRAMUSCULAR; INTRAVENOUS; SOFT TISSUE PRN
Status: DISCONTINUED | OUTPATIENT
Start: 2022-09-19 | End: 2022-09-19 | Stop reason: SDUPTHER

## 2022-09-19 RX ORDER — MAGNESIUM HYDROXIDE 1200 MG/15ML
LIQUID ORAL CONTINUOUS PRN
Status: COMPLETED | OUTPATIENT
Start: 2022-09-19 | End: 2022-09-19

## 2022-09-19 RX ORDER — MEPERIDINE HYDROCHLORIDE 25 MG/ML
12.5 INJECTION INTRAMUSCULAR; INTRAVENOUS; SUBCUTANEOUS
Status: DISCONTINUED | OUTPATIENT
Start: 2022-09-19 | End: 2022-09-19 | Stop reason: HOSPADM

## 2022-09-19 RX ORDER — SODIUM CHLORIDE 9 MG/ML
INJECTION, SOLUTION INTRAVENOUS PRN
Status: DISCONTINUED | OUTPATIENT
Start: 2022-09-19 | End: 2022-09-19 | Stop reason: HOSPADM

## 2022-09-19 RX ORDER — OXYCODONE HYDROCHLORIDE 5 MG/1
5 TABLET ORAL EVERY 6 HOURS PRN
Qty: 28 TABLET | Refills: 0 | Status: SHIPPED | OUTPATIENT
Start: 2022-09-19 | End: 2022-09-26

## 2022-09-19 RX ORDER — LABETALOL 20 MG/4 ML (5 MG/ML) INTRAVENOUS SYRINGE
PRN
Status: DISCONTINUED | OUTPATIENT
Start: 2022-09-19 | End: 2022-09-19 | Stop reason: SDUPTHER

## 2022-09-19 RX ORDER — FENTANYL CITRATE 50 UG/ML
INJECTION, SOLUTION INTRAMUSCULAR; INTRAVENOUS PRN
Status: DISCONTINUED | OUTPATIENT
Start: 2022-09-19 | End: 2022-09-19 | Stop reason: SDUPTHER

## 2022-09-19 RX ORDER — OXYCODONE HYDROCHLORIDE 5 MG/1
5 TABLET ORAL PRN
Status: DISCONTINUED | OUTPATIENT
Start: 2022-09-19 | End: 2022-09-19 | Stop reason: HOSPADM

## 2022-09-19 RX ORDER — DIPHENHYDRAMINE HYDROCHLORIDE 50 MG/ML
12.5 INJECTION INTRAMUSCULAR; INTRAVENOUS
Status: DISCONTINUED | OUTPATIENT
Start: 2022-09-19 | End: 2022-09-19 | Stop reason: HOSPADM

## 2022-09-19 RX ORDER — SODIUM CHLORIDE, SODIUM LACTATE, POTASSIUM CHLORIDE, CALCIUM CHLORIDE 600; 310; 30; 20 MG/100ML; MG/100ML; MG/100ML; MG/100ML
INJECTION, SOLUTION INTRAVENOUS CONTINUOUS
Status: DISCONTINUED | OUTPATIENT
Start: 2022-09-19 | End: 2022-09-19 | Stop reason: HOSPADM

## 2022-09-19 RX ORDER — ROPIVACAINE HYDROCHLORIDE 5 MG/ML
INJECTION, SOLUTION EPIDURAL; INFILTRATION; PERINEURAL PRN
Status: DISCONTINUED | OUTPATIENT
Start: 2022-09-19 | End: 2022-09-19 | Stop reason: SDUPTHER

## 2022-09-19 RX ORDER — ONDANSETRON 2 MG/ML
INJECTION INTRAMUSCULAR; INTRAVENOUS PRN
Status: DISCONTINUED | OUTPATIENT
Start: 2022-09-19 | End: 2022-09-19 | Stop reason: SDUPTHER

## 2022-09-19 RX ORDER — MORPHINE SULFATE 2 MG/ML
2 INJECTION, SOLUTION INTRAMUSCULAR; INTRAVENOUS
Status: DISCONTINUED | OUTPATIENT
Start: 2022-09-19 | End: 2022-09-19 | Stop reason: HOSPADM

## 2022-09-19 RX ORDER — MORPHINE SULFATE 4 MG/ML
4 INJECTION, SOLUTION INTRAMUSCULAR; INTRAVENOUS
Status: DISCONTINUED | OUTPATIENT
Start: 2022-09-19 | End: 2022-09-19 | Stop reason: HOSPADM

## 2022-09-19 RX ORDER — GLYCOPYRROLATE 1 MG/5 ML
SYRINGE (ML) INTRAVENOUS PRN
Status: DISCONTINUED | OUTPATIENT
Start: 2022-09-19 | End: 2022-09-19 | Stop reason: SDUPTHER

## 2022-09-19 RX ORDER — LIDOCAINE HYDROCHLORIDE 10 MG/ML
2 INJECTION, SOLUTION EPIDURAL; INFILTRATION; INTRACAUDAL; PERINEURAL ONCE
Status: COMPLETED | OUTPATIENT
Start: 2022-09-19 | End: 2022-09-19

## 2022-09-19 RX ORDER — CEPHALEXIN 500 MG/1
500 CAPSULE ORAL 4 TIMES DAILY
Qty: 4 CAPSULE | Refills: 0 | Status: SHIPPED | OUTPATIENT
Start: 2022-09-19 | End: 2022-09-20

## 2022-09-19 RX ORDER — MIDAZOLAM HYDROCHLORIDE 1 MG/ML
INJECTION INTRAMUSCULAR; INTRAVENOUS PRN
Status: DISCONTINUED | OUTPATIENT
Start: 2022-09-19 | End: 2022-09-19 | Stop reason: SDUPTHER

## 2022-09-19 RX ORDER — LIDOCAINE HYDROCHLORIDE 20 MG/ML
INJECTION, SOLUTION EPIDURAL; INFILTRATION; INTRACAUDAL; PERINEURAL PRN
Status: DISCONTINUED | OUTPATIENT
Start: 2022-09-19 | End: 2022-09-19 | Stop reason: ALTCHOICE

## 2022-09-19 RX ORDER — SODIUM CHLORIDE 0.9 % (FLUSH) 0.9 %
5-40 SYRINGE (ML) INJECTION PRN
Status: DISCONTINUED | OUTPATIENT
Start: 2022-09-19 | End: 2022-09-19 | Stop reason: HOSPADM

## 2022-09-19 RX ORDER — SODIUM CHLORIDE 0.9 % (FLUSH) 0.9 %
5-40 SYRINGE (ML) INJECTION EVERY 12 HOURS SCHEDULED
Status: DISCONTINUED | OUTPATIENT
Start: 2022-09-19 | End: 2022-09-19 | Stop reason: HOSPADM

## 2022-09-19 RX ORDER — PROPOFOL 10 MG/ML
INJECTION, EMULSION INTRAVENOUS PRN
Status: DISCONTINUED | OUTPATIENT
Start: 2022-09-19 | End: 2022-09-19 | Stop reason: SDUPTHER

## 2022-09-19 RX ORDER — OXYCODONE HYDROCHLORIDE 5 MG/1
10 TABLET ORAL PRN
Status: DISCONTINUED | OUTPATIENT
Start: 2022-09-19 | End: 2022-09-19 | Stop reason: HOSPADM

## 2022-09-19 RX ORDER — METOCLOPRAMIDE HYDROCHLORIDE 5 MG/ML
10 INJECTION INTRAMUSCULAR; INTRAVENOUS
Status: DISCONTINUED | OUTPATIENT
Start: 2022-09-19 | End: 2022-09-19 | Stop reason: HOSPADM

## 2022-09-19 RX ORDER — SODIUM CHLORIDE 9 MG/ML
25 INJECTION, SOLUTION INTRAVENOUS PRN
Status: DISCONTINUED | OUTPATIENT
Start: 2022-09-19 | End: 2022-09-19 | Stop reason: HOSPADM

## 2022-09-19 RX ADMIN — SODIUM CHLORIDE, POTASSIUM CHLORIDE, SODIUM LACTATE AND CALCIUM CHLORIDE: 600; 310; 30; 20 INJECTION, SOLUTION INTRAVENOUS at 14:23

## 2022-09-19 RX ADMIN — LABETALOL 20 MG/4 ML (5 MG/ML) INTRAVENOUS SYRINGE 20 MG: at 15:15

## 2022-09-19 RX ADMIN — DEXAMETHASONE SODIUM PHOSPHATE 8 MG: 4 INJECTION, SOLUTION INTRAMUSCULAR; INTRAVENOUS at 13:57

## 2022-09-19 RX ADMIN — ROPIVACAINE HYDROCHLORIDE 30 ML: 5 INJECTION, SOLUTION EPIDURAL; INFILTRATION; PERINEURAL at 13:23

## 2022-09-19 RX ADMIN — FENTANYL CITRATE 50 MCG: 50 INJECTION, SOLUTION INTRAMUSCULAR; INTRAVENOUS at 14:23

## 2022-09-19 RX ADMIN — CEFAZOLIN 2 G: 10 INJECTION, POWDER, FOR SOLUTION INTRAVENOUS at 13:40

## 2022-09-19 RX ADMIN — ONDANSETRON 4 MG: 2 INJECTION INTRAMUSCULAR; INTRAVENOUS at 13:48

## 2022-09-19 RX ADMIN — LIDOCAINE HYDROCHLORIDE 0.1 ML: 10 INJECTION, SOLUTION EPIDURAL; INFILTRATION; INTRACAUDAL; PERINEURAL at 11:48

## 2022-09-19 RX ADMIN — PROPOFOL 200 MG: 10 INJECTION, EMULSION INTRAVENOUS at 13:48

## 2022-09-19 RX ADMIN — FENTANYL CITRATE 50 MCG: 50 INJECTION, SOLUTION INTRAMUSCULAR; INTRAVENOUS at 13:48

## 2022-09-19 RX ADMIN — SODIUM CHLORIDE, POTASSIUM CHLORIDE, SODIUM LACTATE AND CALCIUM CHLORIDE 1000 ML: 600; 310; 30; 20 INJECTION, SOLUTION INTRAVENOUS at 11:48

## 2022-09-19 RX ADMIN — MIDAZOLAM HYDROCHLORIDE 2 MG: 1 INJECTION, SOLUTION INTRAMUSCULAR; INTRAVENOUS at 13:20

## 2022-09-19 RX ADMIN — Medication 0.4 MG: at 14:32

## 2022-09-19 NOTE — H&P
The history and physical in the electronic medical record dated 9/6/22 was personally reviewed. There are no interval changes that need to be made. Plan is to proceed with a ` arthroscopic synovectomy of the right knee as scheduled.

## 2022-09-19 NOTE — OP NOTE
Operative Note      Patient: Connie Morley  YOB: 1940  MRN: [id number]    Date of Procedure: 9/19/2022    Pre-Op Diagnosis: PAIN DUE TO TOTAL RIGHT KNEE REPLACEMENT, INITIAL ENCOUNTER    Post-Op Diagnosis:  Synovitis a previously performed right total knee replacement with 2 shelves of synovium that were observed to be being pinched between the femur and the tibia polyethylene, 1 medially and 1 laterally       Procedure(s):  ARTHROSCOPIC SYNOVECTOMY OF THE RIGHT KNEE, 3 compartments    Surgeon(s):  Citlali Robbins MD    Assistant:   First Assistant: Ana Malone    Anesthesia: Choice    Estimated Blood Loss (mL): Minimal    Complications: None    Specimens:   * No specimens in log *    Implants:  * No implants in log *      Drains: * No LDAs found *    Findings: Synovium being pinched between the polyethylene and the femur both medially and laterally; expansive plica rubbing over the medial femoral condyle    Detailed Description of Procedure:   Patient was brought to the operating room. After adequate induction of anesthesia by anesthesiology patient was placed supine on the operating table. Tourniquet was applied to the right upper thigh. Right lower extremity was prepped and draped in sterile fashion with ChloraPrep scrub. Anterior medial and anterior lateral working portals were established. The arthroscope was inserted into the anterolateral portal.  Visual inspection began on the undersurface of the patella. The polyethylene component appeared to be well fixed. There was a large bridge of synovium which was rubbing over the medial femoral condyle and covering up the entire dome medial joint space in flexion. This was taken down with a 4 oh shaver and rendered incompetent. The scope was then passed into the medial joint space.   It was immediately evident upon entering the medial joint space, that there was a shelf of synovium that was being pinched between the femur and the tibial polyethylene. Utilizing a grasper, it was pulled out of the joint, it was taken down with an upbiter, and the 4 oh shaver was inserted in. An aggressive synovectomy was performed circumferentially around the tibial component so that no more synovium was overhanging the polyethylene, so that nothing could get pinched between the femoral component and the polyethylene. The scope was passed into the intercondylar notch. There was an abundance and proliferative synovitis there which was getting pinched medially this was taken down with a 4 oh shaver. The scope was then passed into the lateral joint space, and once again, there was an expansive shelf of synovium that was residing between the tibial polyethylene and the femoral component. It was grabbed with a grasper and pulled out of the joint. It was taken down with an upbiter, and in the 4 oh shaver was utilized to remove the remnants. An aggressive circumferential synovectomy was performed around the tibial polyethylene at this point in time. This time on the medial side. This was performed so that no further synovium could get pinched between the bone ends. The scope was then passed up into the lateral gutter. There were no loose bodies. The scope was then passed into the suprapatellar pouch and likewise no loose bodies. Scope was passed into the medial gutter and no loose bodies. Final look was taken at the synovium on the medial side. There was no further synovium being pinched between the femoral component and the polyethylene liner. Likewise, a second look was taken laterally, and there was no longer any synovium being pinched. The knee was copiously irrigated out with normal saline. The scope was withdrawn. A 3-0 subcuticular Monocryl was placed, staples were placed over the portal sites, and skin glue was applied. A sterile dressing was applied patient was stable to the PACU.     Postoperative management:  Patient will be discharged home weightbearing as tolerated. She will receive appropriate pain medications. We are going to go ahead and treat her with 24 hours of oral antibiotics as a precautionary measure due to the fact that there is a prosthesis in the knee. We will get her started in physical therapy. She will follow-up in the office in 2 to 3 weeks.     Electronically signed by David Bennett MD on 9/19/2022 at 3:16 PM

## 2022-09-19 NOTE — PROGRESS NOTES
Discharge instructions reviewed with pt and her sister. Both verbalized understanding of instructions with no questions.

## 2022-09-19 NOTE — ANESTHESIA POSTPROCEDURE EVALUATION
Department of Anesthesiology  Postprocedure Note    Patient: Vicente Corbett  MRN: [id number]  YOB: 1940  Date of evaluation: 9/19/2022      Procedure Summary     Date: 09/19/22 Room / Location: [address]    Anesthesia Start: 7363 Anesthesia Stop: 7792    Procedure: ARTHROSCOPIC SYNOVECTOMY OF THE RIGHT KNEE (Right: Knee) Diagnosis:       Pain due to total right knee replacement, initial encounter ([facility]      (PAIN DUE TO TOTAL RIGHT KNEE REPLACEMENT, INITIAL ENCOUNTER)    Surgeons: Bacilio Qiu MD Responsible Provider: Stephania Hathaway DO    Anesthesia Type: general ASA Status: 3          Anesthesia Type: No value filed.     Saloni Phase I: Saloni Score: 10    Saloni Phase II:        Anesthesia Post Evaluation    Patient location during evaluation: bedside  Patient participation: complete - patient participated  Level of consciousness: awake and awake and alert  Pain score: 1  Airway patency: patent  Nausea & Vomiting: no nausea and no vomiting  Complications: no  Cardiovascular status: blood pressure returned to baseline and hemodynamically stable  Respiratory status: acceptable  Hydration status: euvolemic

## 2022-09-19 NOTE — DISCHARGE INSTRUCTIONS
Dr. Therese Martinez MD  [facility]    Post Operative Instructions for Knee Arthroscopy     Incision and dressing  Your incisions are covered with 4 x 4's, absorbent pads, cotton, and an Ace wrap. The dressing may be removed 2 days after surgery. The incisions have been closed with skin glue. The glue will flake off over time and fall off on its own. DO NOT apply any lotions, creams, peroxide or Betadine to the skin glue, as it will degrade and dissolve the glue. DO NOT peel the glue off, as this could result in opening of the incision. There are no sutures that need to be removed; the skin and subcutaneous layers have been closed with dissolvable sutures, which will dissolve over 7 to 8 weeks. Showering  Once the dressing has been removed in 2 days, you may shower. Running soap and water over the incisions are fine. No soaking or hot tubs or baths until 6 weeks after surgery. Let the water run over the incision, and pat dry with a towel. Do not scrub or rub the glue. Activity  You will begin activity immediately after surgery. Physical therapy will commence (at home or as an outpatient) within a few days of surgery. An order has been placed for physical therapy. You can call [facility] or [facility], and arrange therapy. Your knee will be sore, despite the small incisions. This is due to the fact that the knee needed to be filled with water in order to make room for the instruments to be utilized. You will receive crutches. It is okay to put weight on the leg as tolerated. That does not mean that you need to walk on it immediately, it means that you may progress weightbearing as tolerated. It is encouraged that you get up for short walks frequently throughout the day. Pump your ankles up and down at least 10 times every hour while you are awake, or if you are standing, stand on your toes 10 times every hour while you are awake.   Rest and elevate your leg frequently throughout the day. Proper elevation aims to return fluid and edema to the heart. That is, the foot should be above the knee, the knee should be above the hip, and the hip should be above the heart. This can be accomplished by placing a pillow under your rear end, and propping the leg up on the back of a couch or on a wall. Ice your incision frequently -  20 minutes every hour for the first few days and then as needed to assist with swelling. He will start with the crutches. As you progress in your therapy, you can get rid of the crutches and walk unassisted as tolerated. Medications  You received a prescription for oxycodone. This has been phoned into your pharmacy. You may also take Tylenol, 1000 mg every 8 hours for 1 week after surgery. You may wean off of the pain medications as tolerated. An over-the-counter stool softener such as Colace or Dulcolax as recommended his pain medications can lead to constipation. Take this as needed until your bowel function is normal.        Follow-up  You will have a follow-up appointment with Dr. Christine Boyd approximately 3 weeks after the date of your surgery. CALL THE OFFICE ([phone]) if:  You run a fever of 100 degrees or higher that will not subside with Tylenol. You noticed drainage from the incision. You have worsening swelling or pain that is not relieved by rest or medication. Discharge Instructions for Peripheral Nerve Block Patients     Your nerve block is expected to last between about 16-32 hours after surgery. This is an estimation as to how long your nerve block will last. Your nerve block may wear off earlier or may last longer. Taking Your Pain Medication:     If needed, your surgeon will give you a prescription for pain medication. Start taking this medication before the nerve block first begins to wear off or when you first begin to feel discomfort.  The idea is to have pain medication in your body before the nerve block wears off. It takes about 60 minutes for the oral pain medication to become fully effective. Keep in mind that nerve blocks often wear off in the middle of the night. If you are going to bed and the block has not started to wear off or you have not had any discomfort, consider setting an alarm to go off in 2-3 hours so you can assess your block. If you notice the block is wearing off or you are starting to have discomfort you can then take your medication. You need to take your pain medication as prescribed. Pain medications can cause sedation and decrease your breathing if you take more than you need for the level of pain you are having. This effect can be exponentially worse if you have sleep apnea. Nausea is a common side effect of many pain medications. You may want to eat something before taking your pain medicine to help prevent nausea. What to expect after a nerve block  Nerve blocks affect many types of nerves, including nerves that control movement, pain, and normal sensation. Nerve blocks cause feelings such as:  numbness  tingling  heaviness  weakness or inability to move your arm or leg  a feeling that your arm or leg has fallen asleep. A nerve block can last for 2-36 hours or more depending on the medications used. Usually the weakness wears off first. The tingling and heaviness usually wear off next. Finally you may start to notice pain. Keep in mind that this may occur in any order. Once a nerve block starts to wear off it is usually completely gone within 60 minutes. Certain nerve blocks may cause other symptoms. If you have had a shoulder block or a block near your collar bone, you may have symptoms such as:  mild shortness of breath  a hoarse voice  blurry vision  unequal pupils  drooping of your face on the same side as the nerve block  Swelling at site of neck where block was placed  These are common side effects of this type of nerve block.  These symptoms usually go away within 12-24 hours. If you have severe or prolonged shortness of breath, please go to the nearest Emergency Room  If you continue to feel the effects of the nerve block for longer than 48 hours, please call [facility] [phone] and ask to speak with the Anesthetist on call. Protection of a Numb Arm or Leg  After a nerve block, you cannot feel pain, pressure, or extremes in temperature in the effected limb. Because your arm or leg is numb it is at risk for injury. For example, it is possible to burn your numb arm or leg on a hot stove without knowing it. Here are some helpful tips to protect your arm or leg while it is numb: While you are awake change position of your arm or leg often. This helps to avoid putting too much pressure on the limb for long periods of time. While sleeping, pad the limb with pillows to avoid rolling onto it while you sleep. If you have had a shoulder or arm block, it is a good idea to sleep in a recliner with pillows under your arm to avoid rolling onto your numb arm as you sleep. If you have a cast or tight dressing, check the color of your fingers/toes every couple of hours. Call your surgeon if any look discolored. If you have had a shoulder, arm, or hand block, you may go home with a sling. The sling will help to keep your arm in a safe position. Wear the sling at all times until the nerve block completely wears off. If you have had a leg block, you may have difficulty bearing weight on that leg. You may be sent home with crutches to use until the nerve block wears ff. Have someone assist you with walking until the nerve block completely wears off. Use caution in cold weather. Your numb leg or arm will not be able to feel extremes in temperature. Be sure to cover your limb appropriately before going outside in order to prevent frostbite.   Ask your family or other support people to help you with the above tipsDrMark Montelongo MD  [address] [place] [address]

## 2022-09-19 NOTE — ADDENDUM NOTE
Addendum  created 09/19/22 1529 by TATUM Reyes CRNA    Intraprocedure Meds edited, Orders acknowledged in Narrator

## 2022-09-19 NOTE — ANESTHESIA PRE PROCEDURE
Department of Anesthesiology  Preprocedure Note       Name:  Jazmín Hartmann   Age:  81 y.o.  :  1940                                          MRN:  [id number]         Date:  2022      Surgeon: Sima Martell):  Ralph Soni MD    Procedure: Procedure(s):  ARTHROSCOPIC SYNOVECTOMY OF THE RIGHT KNEE  . ARTHROSCOPY EQUIPMENT. CHOICE WITH ADDUCTOR CANAL BLOCK (PAT WITH DAN)    Medications prior to admission:   Prior to Admission medications    Medication Sig Start Date End Date Taking? Authorizing Provider   zolpidem (AMBIEN) 5 MG tablet TAKE 1 TO 2 TABLETS BY MOUTH AT BEDTIME AS NEEDED MUST LAST 30 DAYS 22   Historical Provider, MD   chlorhexidine (HIBICLENS) 4 % external liquid Apply topically daily for 3 days prior to surgery.  22   SEBAS Olmedo   predniSONE (DELTASONE) 10 MG tablet  22   Historical Provider, MD   aspirin 81 MG EC tablet Take 81 mg by mouth daily 20   Historical Provider, MD   Amoxicillin 500 MG TABS Take 500 mg by mouth  Patient not taking: Reported on 2022 3/7/18   Historical Provider, MD   albuterol sulfate HFA (PROVENTIL;VENTOLIN;PROAIR) 108 (90 Base) MCG/ACT inhaler Inhale 1 puff into the lungs every 6 hours as needed 13   Historical Provider, MD   calcium carbonate 1500 (600 Ca) MG TABS tablet Take 1,200 mg by mouth 19   Historical Provider, MD   vitamin D (CHOLECALCIFEROL) 25 MCG (1000 UT) TABS tablet Take by mouth    Historical Provider, MD   fluticasone (FLONASE) 50 MCG/ACT nasal spray   See Instructions, Allergy symptoms, Refill(s) 0, 1 spray(s) Nasal Daily 19   Historical Provider, MD   hydrOXYzine (ATARAX) 10 MG tablet prn 04   Historical Provider, MD   loperamide (IMODIUM A-D) 2 MG tablet prn 04   Historical Provider, MD   loratadine (CLARITIN) 10 MG tablet Take 10 mg by mouth nightly 19   Historical Provider, MD   losartan (COZAAR) 25 MG tablet Take 25 mg by mouth daily 19   Historical Provider, MD   omeprazole (PRILOSEC) 20 MG delayed release capsule Take 20 mg by mouth 19   Historical Provider, MD   PARoxetine (PAXIL) 20 MG tablet  22   Historical Provider, MD   simvastatin (ZOCOR) 10 MG tablet Take 10 mg by mouth nightly 20   Historical Provider, MD   triamterene-hydroCHLOROthiazide (MAXZIDE-25) 37.5-25 MG per tablet  22   Historical Provider, MD       Current medications:    Current Facility-Administered Medications   Medication Dose Route Frequency Provider Last Rate Last Admin    ceFAZolin (ANCEF) 2000 mg in dextrose 5 % 100 mL IVPB  2,000 mg IntraVENous On Call to 26 Catina Gonzales MD        lactated ringers infusion   IntraVENous Continuous Krystin Newman  mL/hr at 22 1148 1,000 mL at 22 1148       Allergies:  No Known Allergies    Problem List:  There is no problem list on file for this patient.       Past Medical History:        Diagnosis Date    Acute eczema     Anxiety     Hypertension        Past Surgical History:        Procedure Laterality Date    JOINT REPLACEMENT Left     Partial, knee    TOTAL KNEE ARTHROPLASTY Right 2020       Social History:    Social History     Tobacco Use    Smoking status: Former     Packs/day: 0.50     Years: 30.00     Pack years: 15.00     Types: Cigarettes     Quit date:      Years since quittin.7    Smokeless tobacco: Never   Substance Use Topics    Alcohol use: Not on file                                Counseling given: Not Answered      Vital Signs (Current):   Vitals:    22 1116   BP: (!) 165/63   Pulse: 86   Resp: 20   Temp: 97.2 °F (36.2 °C)   TempSrc: Temporal   SpO2: 96%   Weight: 136 lb (61.7 kg)   Height: 5' 1\" (1.549 m)                                              BP Readings from Last 3 Encounters:   22 (!) 165/63   22 135/67       NPO Status: Time of last liquid consumption: 0000                        Time of last solid consumption: 0000                        Date of last liquid consumption: 09/19/22                        Date of last solid food consumption: 09/19/22    BMI:   Wt Readings from Last 3 Encounters:   09/19/22 136 lb (61.7 kg)   09/06/22 136 lb (61.7 kg)   07/29/22 141 lb (64 kg)     Body mass index is 25.7 kg/m². CBC:   Lab Results   Component Value Date/Time    WBC 5.4 09/06/2022 02:42 PM    RBC 4.01 09/06/2022 02:42 PM    HGB 12.0 09/06/2022 02:42 PM    HCT 35.4 09/06/2022 02:42 PM    MCV 88.3 09/06/2022 02:42 PM    RDW 13.9 09/06/2022 02:42 PM     09/06/2022 02:42 PM       CMP:   Lab Results   Component Value Date/Time     09/06/2022 02:42 PM    K 4.2 09/06/2022 02:42 PM     09/06/2022 02:42 PM    CO2 27 09/06/2022 02:42 PM    BUN 22 09/06/2022 02:42 PM    CREATININE 0.78 09/06/2022 02:42 PM    GFRAA >60.0 09/06/2022 02:42 PM    LABGLOM >60.0 09/06/2022 02:42 PM    GLUCOSE 84 09/06/2022 02:42 PM    CALCIUM 9.7 09/06/2022 02:42 PM       POC Tests: No results for input(s): POCGLU, POCNA, POCK, POCCL, POCBUN, POCHEMO, POCHCT in the last 72 hours.     Coags: No results found for: PROTIME, INR, APTT    HCG (If Applicable): No results found for: PREGTESTUR, PREGSERUM, HCG, HCGQUANT     ABGs: No results found for: PHART, PO2ART, DUJ2UNT, FXR1TWI, BEART, X5TBVHMV     Type & Screen (If Applicable):  No results found for: LABABO, LABRH    Drug/Infectious Status (If Applicable):  No results found for: HIV, HEPCAB    COVID-19 Screening (If Applicable): No results found for: COVID19        Anesthesia Evaluation  Patient summary reviewed and Nursing notes reviewed no history of anesthetic complications:   Airway: Mallampati: II  TM distance: >3 FB   Neck ROM: full  Mouth opening: > = 3 FB   Dental: normal exam         Pulmonary:Negative Pulmonary ROS and normal exam                               Cardiovascular:    (+) hypertension:,       ECG reviewed                        Neuro/Psych:   Negative Neuro/Psych ROS              GI/Hepatic/Renal: Neg GI/Hepatic/Renal ROS            Endo/Other: Negative Endo/Other ROS                    Abdominal:             Vascular: negative vascular ROS. Other Findings:           Anesthesia Plan      general     ASA 3       Induction: intravenous. MIPS: Prophylactic antiemetics administered and prophylactic pharmacologic antiemetic agents not administered perioperatively for documented reasons. Anesthetic plan and risks discussed with patient.                         Chyna Spencer MD   9/19/2022

## 2022-09-29 ENCOUNTER — OFFICE VISIT (OUTPATIENT)
Dept: ORTHOPEDIC SURGERY | Age: 82
End: 2022-09-29

## 2022-09-29 VITALS
HEART RATE: 93 BPM | HEIGHT: 61 IN | OXYGEN SATURATION: 98 % | TEMPERATURE: 98 F | BODY MASS INDEX: 25.68 KG/M2 | WEIGHT: 136 LBS

## 2022-09-29 DIAGNOSIS — Z98.890 POST-OPERATIVE STATE: Primary | ICD-10-CM

## 2022-09-29 PROCEDURE — 99024 POSTOP FOLLOW-UP VISIT: CPT | Performed by: ORTHOPAEDIC SURGERY

## 2022-09-29 NOTE — PROGRESS NOTES
Narrative Referring Provider:   Jame Castellanos      PCP:   Loida Oseguera,     ============================  IMPRESSION/PLAN:  ============================  Vicente Corbett is s/p right  arthroscopic synovectomy on a painful right total knee replacement  completed on 2022. IMPRESSION: Excellent early outcome    PLAN:  No new treatment indicated. Routine follow-up. Ice compression and elevation  Patient Reassurance: Normal post-operative course discussed with patient. Patient reassured and supported. All questions answered. Follow up 3 months No X-Rays Needed    Patient presents today for a a routine 1st post-op visit    Status post op:  right  arthroscopic synovectomy of a painful right knee replacement      BMI: There is no height or weight on file to calculate BMI. Post-operative recovery was complicated by uneventful/none. Patient rates their condition as improving. Does the patient still experience pain? 1/10 pain, aching    Post Op discharge patient location: in home. Functional Assessment is as follows: Patient does not feel she needs therapy. Functional difficulties: None. Pain Medication: None  Currently Ambulating with: No assistive devices    =================================  EXAM: POST OP KNEE  =================================  Right Post-Operative Knee    Ambulates with a l normal gait    SKIN: Appropriate postop appearance, No evidence of erythema, warmth, discharge or drainage and Incision clean/dry/intact. Staples were removed from the portal sites    Range of motion is 0 degrees in extension and   118 degrees of flexion. Extension La degrees    Pain with ROM: No    There is Mild effusion.      Mal-alignment: No    No tenderness to palpation about the knee    Neurovascular Status: Sensation Intact, Moves foot and ankle up & down, 2+ dorsalis pedis and negative homans sign    Stability:Varus/Valgus- Yes, stable    Quad strength: improving    Imaging:  None    Provider:   Yolanda Lewis MD

## 2023-01-05 ENCOUNTER — OFFICE VISIT (OUTPATIENT)
Dept: ORTHOPEDIC SURGERY | Age: 83
End: 2023-01-05
Payer: MEDICARE

## 2023-01-05 DIAGNOSIS — Z96.651 PAIN DUE TO TOTAL RIGHT KNEE REPLACEMENT, SUBSEQUENT ENCOUNTER: Primary | ICD-10-CM

## 2023-01-05 DIAGNOSIS — T84.84XD PAIN DUE TO TOTAL RIGHT KNEE REPLACEMENT, SUBSEQUENT ENCOUNTER: Primary | ICD-10-CM

## 2023-01-05 PROCEDURE — G8417 CALC BMI ABV UP PARAM F/U: HCPCS | Performed by: ORTHOPAEDIC SURGERY

## 2023-01-05 PROCEDURE — G8427 DOCREV CUR MEDS BY ELIG CLIN: HCPCS | Performed by: ORTHOPAEDIC SURGERY

## 2023-01-05 PROCEDURE — G8484 FLU IMMUNIZE NO ADMIN: HCPCS | Performed by: ORTHOPAEDIC SURGERY

## 2023-01-05 PROCEDURE — 1090F PRES/ABSN URINE INCON ASSESS: CPT | Performed by: ORTHOPAEDIC SURGERY

## 2023-01-05 PROCEDURE — 99213 OFFICE O/P EST LOW 20 MIN: CPT | Performed by: ORTHOPAEDIC SURGERY

## 2023-01-05 PROCEDURE — 1123F ACP DISCUSS/DSCN MKR DOCD: CPT | Performed by: ORTHOPAEDIC SURGERY

## 2023-01-05 PROCEDURE — G8400 PT W/DXA NO RESULTS DOC: HCPCS | Performed by: ORTHOPAEDIC SURGERY

## 2023-01-05 PROCEDURE — 1036F TOBACCO NON-USER: CPT | Performed by: ORTHOPAEDIC SURGERY

## 2023-01-05 NOTE — PROGRESS NOTES
Narrative Referring Provider:   Patrick Del Rio      PCP:   Rukhsana Cameron DO    ============================  IMPRESSION/PLAN:  ============================  Tenisha Lamp is s/p right  arthroscopic synovectomy on a painful knee replacement  completed on 2022. IMPRESSION: No complaints or limitations and Excellent long-term outcome    PLAN:  No new treatment indicated. Routine follow-up. Patient Reassurance: Normal post-operative course discussed with patient. Patient reassured and supported. All questions answered. Follow up as needed    Patient presents today for an intermediate post-op visit    Status post op:  right  knee arthroscopy and synovectomy      BMI: There is no height or weight on file to calculate BMI. Post-operative recovery was complicated by uneventful/none. Patient rates their condition as improving. Does the patient still experience pain? No  Post Op discharge patient location: in home. Functional Assessment is as follows: Therapy is completed  Functional difficulties: None. Pain Medication: None  Currently Ambulating with: No assistive devices    =================================  EXAM: POST OP KNEE  =================================  Right Post-Operative Knee    Ambulates with a normal gait    SKIN: Appropriate postop appearance, No evidence of erythema, warmth, discharge or drainage and Incision clean/dry/intact. Range of motion is 0 degrees in extension and   118 degrees of flexion. Extension La degrees    Pain with ROM: No    There is minimal effusion.      Mal-alignment: No    No tenderness to palpation  Neurovascular Status: Sensation Intact, Moves foot and ankle up & down, 2+ dorsalis pedis and negative homans sign    Stability:Varus/Valgus- Yes, stable    Quad strength: improving    Imaging:  None    Provider:   Jory Toney MD

## (undated) DEVICE — GOWN,AURORA,NONREINFORCED,LARGE: Brand: MEDLINE

## (undated) DEVICE — NEEDLE SPNL 18GA L3.5IN W/ QNCKE SHARPER BVL DURA CLICK

## (undated) DEVICE — PAD,ABDOMINAL,8"X10",ST,LF: Brand: MEDLINE

## (undated) DEVICE — PADDING UNDERCAST W6INXL4YD RAYON POLY SYN NONADHESIVE

## (undated) DEVICE — SPONGE GZ W4XL4IN COT 12 PLY TYP VII WVN C FLD DSGN

## (undated) DEVICE — ADHESIVE SKIN CLSR 0.7ML TOP DERMBND ADV

## (undated) DEVICE — PADDING UNDERCAST W4INXL12FT RAYON POLY SYN NONADHESIVE

## (undated) DEVICE — GLOVE SURG SZ 9 THK91MIL LTX FREE SYN POLYISOPRENE ANTI

## (undated) DEVICE — 3M™ STERI-DRAPE™ U-DRAPE 1015: Brand: STERI-DRAPE™

## (undated) DEVICE — SYRINGE MED 10ML LUERLOCK TIP W/O SFTY DISP

## (undated) DEVICE — GLOVE ORANGE PI 8 1/2   MSG9085

## (undated) DEVICE — LABEL MED MINI W/ MARKER

## (undated) DEVICE — TUBING PMP L8FT LNG W/ CONN FOR AR-6400 REDEUCE

## (undated) DEVICE — [AGGRESSIVE PLUS CUTTER, ARTHROSCOPIC SHAVER BLADE,  DO NOT RESTERILIZE,  DO NOT USE IF PACKAGE IS DAMAGED,  KEEP DRY,  KEEP AWAY FROM SUNLIGHT]: Brand: FORMULA

## (undated) DEVICE — ZIMMER® STERILE DISPOSABLE TOURNIQUET CUFF, DUAL PORT, SINGLE BLADDER, 24 IN. (61 CM)

## (undated) DEVICE — SHEET,DRAPE,53X77,STERILE: Brand: MEDLINE

## (undated) DEVICE — BANDAGE COBAN 4 IN COMPR W4INXL5YD FOAM COHESIVE QUIK STK SELF ADH SFT

## (undated) DEVICE — INTENDED FOR TISSUE SEPARATION, AND OTHER PROCEDURES THAT REQUIRE A SHARP SURGICAL BLADE TO PUNCTURE OR CUT.: Brand: BARD-PARKER ® CARBON RIB-BACK BLADES

## (undated) DEVICE — 4-PORT MANIFOLD: Brand: NEPTUNE 2

## (undated) DEVICE — SUTURE MCRYL SZ 3-0 L27IN ABSRB UD L19MM PS-2 3/8 CIR PRIM Y427H

## (undated) DEVICE — COUNTER NDL 40 COUNT HLD 70 FOAM BLK ADH W/ MAG

## (undated) DEVICE — WAND ABLAT P 50 HND CTRL VAPR

## (undated) DEVICE — SPONGE,LAP,18"X18",DLX,XR,ST,5/PK,40/PK: Brand: MEDLINE

## (undated) DEVICE — PADDING CAST W6INXL4YD RAYON UNDERCAST SOF-ROL

## (undated) DEVICE — APPLICATOR MEDICATED 26 CC SOLUTION HI LT ORNG CHLORAPREP

## (undated) DEVICE — ABSORBENT ROLL ECODRI-SAFE

## (undated) DEVICE — PACK,ARTHROSCOPY II,SIRUS: Brand: MEDLINE

## (undated) DEVICE — HYPODERMIC SAFETY NEEDLE: Brand: MAGELLAN

## (undated) DEVICE — TUBING, SUCTION, 1/4" X 10', STRAIGHT: Brand: MEDLINE

## (undated) DEVICE — MARKER SURG SKIN GENTIAN VLT REG TIP W/ 6IN RUL

## (undated) DEVICE — GOWN,SIRUS,POLYRNF,BRTHSLV,XLN/XXL,18/CS: Brand: MEDLINE

## (undated) DEVICE — CONNECTOR,T,STERILE: Brand: MEDLINE

## (undated) DEVICE — BANDAGE COMPR W6INXL5YD WHT BGE POLY COT M E WRP WV HK AND